# Patient Record
Sex: FEMALE | URBAN - METROPOLITAN AREA
[De-identification: names, ages, dates, MRNs, and addresses within clinical notes are randomized per-mention and may not be internally consistent; named-entity substitution may affect disease eponyms.]

---

## 2024-10-12 ENCOUNTER — OFFICE VISIT (OUTPATIENT)
Dept: URGENT CARE | Age: 34
End: 2024-10-12
Payer: COMMERCIAL

## 2024-10-12 VITALS — TEMPERATURE: 98.8 F | OXYGEN SATURATION: 98 % | HEART RATE: 92 BPM

## 2024-10-12 DIAGNOSIS — R59.0 CERVICAL LYMPHADENOPATHY: ICD-10-CM

## 2024-10-12 DIAGNOSIS — J02.9 PHARYNGITIS, UNSPECIFIED ETIOLOGY: Primary | ICD-10-CM

## 2024-10-12 RX ORDER — METHYLPREDNISOLONE 4 MG/1
TABLET ORAL
Qty: 21 TABLET | Refills: 0 | Status: SHIPPED | OUTPATIENT
Start: 2024-10-12 | End: 2024-10-19

## 2024-10-12 RX ORDER — AZITHROMYCIN 250 MG/1
TABLET, FILM COATED ORAL
Qty: 6 TABLET | Refills: 0 | Status: SHIPPED | OUTPATIENT
Start: 2024-10-12 | End: 2024-10-17

## 2024-10-12 RX ORDER — FLUCONAZOLE 150 MG/1
150 TABLET ORAL SEE ADMIN INSTRUCTIONS
Qty: 2 TABLET | Refills: 0 | Status: SHIPPED | OUTPATIENT
Start: 2024-10-12 | End: 2024-10-13

## 2024-10-12 ASSESSMENT — ENCOUNTER SYMPTOMS
NAUSEA: 0
CONSTITUTIONAL NEGATIVE: 1
VOICE CHANGE: 0
SORE THROAT: 1
COUGH: 0
VOMITING: 0
TROUBLE SWALLOWING: 0

## 2024-10-12 NOTE — PROGRESS NOTES
Subjective   Patient ID: Jailyn Luke is a 34 y.o. female. They present today with a chief complaint of pain on left side of neck.    History of Present Illness  Pain on the left side of her neck for 4 days.  Today, since about 10 AM when she woke up, she has had pain on the left side of her throat when she swallows.  She denies any other symptoms.      History provided by:  Patient   used: No        Past Medical History  Allergies as of 10/12/2024    (No Known Allergies)       (Not in a hospital admission)       History reviewed. No pertinent past medical history.    History reviewed. No pertinent surgical history.         Review of Systems  Review of Systems   Constitutional: Negative.    HENT:  Positive for sore throat. Negative for ear pain, mouth sores, trouble swallowing and voice change.    Respiratory:  Negative for cough.    Gastrointestinal:  Negative for nausea and vomiting.                                  Objective    Vitals:    10/12/24 1917   Pulse: 92   Temp: 37.1 °C (98.8 °F)   SpO2: 98%     No LMP recorded.    Physical Exam  Vitals and nursing note reviewed.   Constitutional:       General: She is not in acute distress.     Appearance: Normal appearance. She is normal weight. She is not ill-appearing, toxic-appearing or diaphoretic.   HENT:      Head: Normocephalic and atraumatic.      Right Ear: Ear canal and external ear normal.      Left Ear: Ear canal and external ear normal.      Nose: Nose normal.      Mouth/Throat:      Mouth: Mucous membranes are moist.      Pharynx: Oropharynx is clear. No oropharyngeal exudate or posterior oropharyngeal erythema.   Eyes:      Extraocular Movements: Extraocular movements intact.      Conjunctiva/sclera: Conjunctivae normal.      Pupils: Pupils are equal, round, and reactive to light.   Neck:      Comments: Minimal tender slightly swollen anterior adenopathy on the left anterior cervical area.  Left sternocleidomastoid muscle is  nontender.  She has good range of motion at the neck without any difficulty.  Pulmonary:      Effort: Respiratory distress present.   Musculoskeletal:         General: No swelling or deformity. Normal range of motion.      Cervical back: Normal range of motion and neck supple.   Skin:     General: Skin is warm.   Neurological:      General: No focal deficit present.      Mental Status: She is alert and oriented to person, place, and time.      Cranial Nerves: No cranial nerve deficit.   Psychiatric:         Mood and Affect: Mood normal.         Behavior: Behavior normal.         Thought Content: Thought content normal.         Judgment: Judgment normal.         Procedures    Point of Care Test & Imaging Results from this visit  No results found for this visit on 10/12/24.   No results found.    Diagnostic study results (if any) were reviewed by GIOVANNI Aguilar.    Assessment/Plan   Allergies, medications, history, and pertinent labs/EKGs/Imaging reviewed by GIOVANNI Aguilar.     Medical Decision Making  History and physical exam are consistent with mild pharyngitis and mild left anterior cervical adenopathy.  Will check her for strep. Will treat her with Zithromax for pharyngitis with cervical adenopathy.  Will also put her on some steroids to help with the symptoms.  Quick strep was negative.  We discussed reasons to follow-up with her PCP, or return to urgent care such as mild worsening of her symptoms.  If she develops severe symptoms such as changes in her voice or difficulty swallowing she should go to the emergency department.  She also requested and was given a prescription for Diflucan as she voiced concerns about potential yeast infection.    Orders and Diagnoses  There are no diagnoses linked to this encounter.    Medical Admin Record      Patient disposition: Home    Electronically signed by GIOVANNI Aguilar  7:45 PM

## 2025-03-02 ENCOUNTER — OFFICE VISIT (OUTPATIENT)
Dept: URGENT CARE | Age: 35
End: 2025-03-02
Payer: COMMERCIAL

## 2025-03-02 VITALS
OXYGEN SATURATION: 100 % | SYSTOLIC BLOOD PRESSURE: 149 MMHG | TEMPERATURE: 97 F | HEART RATE: 95 BPM | DIASTOLIC BLOOD PRESSURE: 96 MMHG

## 2025-03-02 DIAGNOSIS — Z20.822 SUSPECTED 2019 NOVEL CORONAVIRUS INFECTION: ICD-10-CM

## 2025-03-02 DIAGNOSIS — J02.9 SORE THROAT: Primary | ICD-10-CM

## 2025-03-02 LAB
POC BINAX EXPIRATION: 0
POC BINAX NOW COVID SERIAL NUMBER: 0
POC RAPID INFLUENZA A: NEGATIVE
POC RAPID INFLUENZA B: NEGATIVE
POC RAPID STREP: NEGATIVE
POC SARS-COV-2 AG BINAX: NORMAL

## 2025-03-02 PROCEDURE — 87880 STREP A ASSAY W/OPTIC: CPT

## 2025-03-02 PROCEDURE — 87811 SARS-COV-2 COVID19 W/OPTIC: CPT

## 2025-03-02 PROCEDURE — 87804 INFLUENZA ASSAY W/OPTIC: CPT

## 2025-03-02 PROCEDURE — 99213 OFFICE O/P EST LOW 20 MIN: CPT

## 2025-03-02 RX ORDER — METFORMIN HYDROCHLORIDE 500 MG/1
500 TABLET ORAL
COMMUNITY

## 2025-03-02 NOTE — PROGRESS NOTES
Subjective   Patient ID: Jailyn Luke is a 34 y.o. female. They present today with a chief complaint of Sore Throat (SORE THROAT X4 DAYS, BURNING, BOTH EAR PAIN, STUFFY/RUNNY NOSE XTODAY).      Past Medical History  Allergies as of 03/02/2025    (No Known Allergies)       (Not in a hospital admission)       History reviewed. No pertinent past medical history.    History reviewed. No pertinent surgical history.         Review of Systems  Review of Systems                               Objective    Vitals:    03/02/25 0832   BP: (!) 149/96   BP Location: Left arm   Patient Position: Sitting   BP Cuff Size: Adult   Pulse: 95   Temp: 36.1 °C (97 °F)   TempSrc: Oral   SpO2: 100%     No LMP recorded.    Physical Exam  Vitals reviewed.   HENT:      Head: Normocephalic and atraumatic.      Right Ear: Tympanic membrane and ear canal normal. No tenderness.      Left Ear: Tympanic membrane and ear canal normal. Tenderness present.      Nose: Congestion present.      Mouth/Throat:      Mouth: Mucous membranes are moist.      Pharynx: Oropharynx is clear. Uvula midline. No pharyngeal swelling, oropharyngeal exudate or posterior oropharyngeal erythema.   Eyes:      Extraocular Movements: Extraocular movements intact.      Conjunctiva/sclera: Conjunctivae normal.   Cardiovascular:      Rate and Rhythm: Normal rate and regular rhythm.      Heart sounds: No murmur heard.  Pulmonary:      Effort: Pulmonary effort is normal.      Breath sounds: Normal breath sounds. No decreased breath sounds, wheezing, rhonchi or rales.   Skin:     General: Skin is warm.   Neurological:      Mental Status: She is alert and oriented to person, place, and time.   Psychiatric:         Mood and Affect: Mood normal.         Behavior: Behavior normal.             Point of Care Test & Imaging Results from this visit  Results for orders placed or performed in visit on 03/02/25   POCT Covid-19 Rapid Antigen   Result Value Ref Range    Binax NOW Covid Serial  Number 0     BINAX NOW Covid Expiration 0     POC JANES-COV-2 AG  Presumptive negative test for SARS-CoV-2 (no antigen detected)     Presumptive negative test for SARS-CoV-2 (no antigen detected)   POCT Influenza A/B manually resulted   Result Value Ref Range    POC Rapid Influenza A Negative Negative    POC Rapid Influenza B Negative Negative   POCT rapid strep A manually resulted   Result Value Ref Range    POC Rapid Strep Negative Negative      No results found.    Diagnostic study results (if any) were reviewed by Jaja Lopez PA-C.    Assessment/Plan   Allergies, medications, history, and pertinent labs/EKGs/Imaging reviewed by Jaja Lopez PA-C.     Medical Decision Making  Negative COVID, flu, rapid strep tests.  Strep PCR sent out.  Advised patient to continue taking DayQuil and NyQuil as needed for symptoms.  -         Patient is educated about their diagnoses.     -          Discussed medications benefits and adverse effects.     -          Answered all patient’s questions.     -          Patient will call 911 or go to the nearest ED if worsen symptoms .     -          Patient is agreeable to the plan of care and is deemed stable upon discharge.     -          Follow up with your primary care provider in two days.    Orders and Diagnoses  Diagnoses and all orders for this visit:  Sore throat  -     POCT Covid-19 Rapid Antigen  -     POCT Influenza A/B manually resulted  -     POCT rapid strep A manually resulted  -     Group A Streptococcus, PCR  Suspected 2019 novel coronavirus infection      Medical Admin Record      Patient disposition: Home    Electronically signed by Jaja Lopez PA-C  9:04 AM

## 2025-03-03 LAB — S PYO DNA THROAT QL NAA+PROBE: NOT DETECTED
